# Patient Record
Sex: FEMALE
[De-identification: names, ages, dates, MRNs, and addresses within clinical notes are randomized per-mention and may not be internally consistent; named-entity substitution may affect disease eponyms.]

---

## 2018-03-19 ENCOUNTER — HOSPITAL ENCOUNTER (EMERGENCY)
Dept: HOSPITAL 97 - ER | Age: 53
Discharge: HOME | End: 2018-03-19
Payer: COMMERCIAL

## 2018-03-19 VITALS — TEMPERATURE: 97.1 F | SYSTOLIC BLOOD PRESSURE: 128 MMHG | DIASTOLIC BLOOD PRESSURE: 92 MMHG | OXYGEN SATURATION: 99 %

## 2018-03-19 DIAGNOSIS — Z88.8: ICD-10-CM

## 2018-03-19 DIAGNOSIS — F41.0: Primary | ICD-10-CM

## 2018-03-19 DIAGNOSIS — Z85.3: ICD-10-CM

## 2018-03-19 PROCEDURE — 99283 EMERGENCY DEPT VISIT LOW MDM: CPT

## 2018-03-19 NOTE — EDPHYS
Physician Documentation                                                                           

 [facility]                                                                

Name: Mackenzie Morrow                                                                           

Age: 52 yrs                                                                                       

Sex: Female                                                                                       

: 1965                                                                                   

MRN: [id number]                                                                                   

Arrival Date: 2018                                                                          

Time: 09:58                                                                                       

Account#: [id number]                                                                            

Bed 10                                                                                            

Private MD:                                                                                       

ED Physician Xavier Melchor                                                                         

HPI:                                                                                              

                                                                                             

12:37 This 52 yrs old  Female presents to ER via Ambulatory with complaints of        jr8 

      Anxiety.                                                                                    

12:37 The patient presents to the emergency department with anxiety, depression. Onset: The   jr8 

      symptoms/episode began/occurred gradually, 1 month(s) ago. Associated signs and             

      symptoms: The patient has no apparent associated signs or symptoms. Severity of             

      symptoms: At their worst the symptoms were moderate in the emergency department the         

      symptoms are unchanged. The patient has experienced similar episodes in the past, a few     

      times. The patient has not recently seen a physician. Patient stated that she has been      

      on Lexapro for some time. Had been weaned down on dosage but went back up to 10 after       

      starting to have anxiety. Stated that over the past month has had a lot of personal         

      changes and has been more anxious. Was recently started on Klonopin which helps at          

      night but as soon as she wakes up feels anxious again. Stated that today has been the       

      worst that it has been .                                                                    

                                                                                                  

OB/GYN:                                                                                           

10:19 LMP N/A - Post-menopause                                                                aj  

                                                                                                  

Historical:                                                                                       

- Allergies:                                                                                      

10:19 Benadryl;                                                                               aj  

- Home Meds:                                                                                      

10:19 Lexapro Oral [Active]; unknown antidepressant [Active];                                 aj  

- PMHx:                                                                                           

10:19 Anxiety; Depression; breast cancer;                                                     aj  

- PSHx:                                                                                           

10:19 Lumpectomy;                                                                             aj  

                                                                                                  

- Immunization history:: Adult Immunizations not up to date.                                      

- Social history:: Smoking status: Patient/guardian denies using tobacco.                         

                                                                                                  

                                                                                                  

ROS:                                                                                              

12:37 Eyes: Negative for injury, pain, redness, and discharge, ENT: Negative for injury,      jr8 

      pain, and discharge, Neck: Negative for injury, pain, and swelling, Cardiovascular:         

      Negative for chest pain, palpitations, and edema, Respiratory: Negative for shortness       

      of breath, cough, wheezing, and pleuritic chest pain, Abdomen/GI: Negative for              

      abdominal pain, nausea, vomiting, diarrhea, and constipation, Back: Negative for injury     

      and pain, MS/Extremity: Negative for injury and deformity, Skin: Negative for injury,       

      rash, and discoloration, Neuro: Negative for headache, weakness, numbness, tingling,        

      and seizure.                                                                                

12:37 Psych: Positive for anxiety.                                                                

                                                                                                  

Exam:                                                                                             

12:37 Head/Face:  Normocephalic, atraumatic. Eyes:  Pupils equal round and reactive to light, jr8 

      extra-ocular motions intact.  Lids and lashes normal.  Conjunctiva and sclera are           

      non-icteric and not injected.  Cornea within normal limits.  Periorbital areas with no      

      swelling, redness, or edema. ENT:  Nares patent. No nasal discharge, no septal              

      abnormalities noted.  Tympanic membranes are normal and external auditory canals are        

      clear.  Oropharynx with no redness, swelling, or masses, exudates, or evidence of           

      obstruction, uvula midline.  Mucous membranes moist. Neck:  Trachea midline, no             

      thyromegaly or masses palpated, and no cervical lymphadenopathy.  Supple, full range of     

      motion without nuchal rigidity, or vertebral point tenderness.  No Meningismus.             

      Cardiovascular:  Regular rate and rhythm with a normal S1 and S2.  No gallops, murmurs,     

      or rubs.  Normal PMI, no JVD.  No pulse deficits. Respiratory:  Lungs have equal breath     

      sounds bilaterally, clear to auscultation and percussion.  No rales, rhonchi or wheezes     

      noted.  No increased work of breathing, no retractions or nasal flaring. Abdomen/GI:        

      Soft, non-tender, with normal bowel sounds.  No distension or tympany.  No guarding or      

      rebound.  No evidence of tenderness throughout. Back:  No spinal tenderness.  No            

      costovertebral tenderness.  Full range of motion. Skin:  Warm, dry with normal turgor.      

      Normal color with no rashes, no lesions, and no evidence of cellulitis. MS/ Extremity:      

      Pulses equal, no cyanosis.  Neurovascular intact.  Full, normal range of motion. Neuro:     

       Awake and alert, GCS 15, oriented to person, place, time, and situation.  Cranial          

      nerves II-XII grossly intact.  Motor strength 5/5 in all extremities.  Sensory grossly      

      intact.  Cerebellar exam normal.  Normal gait.                                              

12:37 Constitutional: The patient appears alert, awake, anxious.                                  

12:37 Psych: Behavior/mood is cooperative, anxious, Affect is calm, Oriented to person,           

      place, time, Patient has no thoughts/intents to harm self or others. Judgement /            

      Insight is normal. Memory is normal. Delusions/hallucinations are not present.              

                                                                                                  

Vital Signs:                                                                                      

10:19  / 92; Pulse 79; Resp 17; Temp 97.1; Pulse Ox 99% on R/A; Weight 80.74 kg; Height aj  

      5 ft. 1 in. (154.94 cm); Pain 0/10;                                                         

10:19 Body Mass Index 33.63 (80.74 kg, 154.94 cm)                                             aj  

                                                                                                  

MDM:                                                                                              

11:38 Patient medically screened.                                                             jr8 

13:27 Data reviewed: vital signs, nurses notes, and as a result, I will discharge patient.    jr8 

      Data interpreted: Pulse oximetry: on room air is 99 %. Interpretation: normal.              

      Counseling: I had a detailed discussion with the patient and/or guardian regarding: the     

      historical points, exam findings, and any diagnostic results supporting the                 

      discharge/admit diagnosis, the need for outpatient follow up, a family practitioner, to     

      return to the emergency department if symptoms worsen or persist or if there are any        

      questions or concerns that arise at home. Response to treatment: the patient's symptoms     

      have markedly improved after treatment.                                                     

                                                                                                  

Administered Medications:                                                                         

13:08 Drug: XANax Tablet 1 mg Route: PO;                                                      iw  

13:40 Follow up: Response: No adverse reaction                                                iw  

                                                                                                  

                                                                                                  

Disposition:                                                                                      

15:11 Co-signature as Attending Physician, Xavier Melchor MD.                                    rn  

                                                                                                  

Disposition:                                                                                      

18 13:28 Discharged to Home. Impression: Panic disorder [episodic paroxysmal anxiety]       

  without agoraphobia, Anxiety disorder, unspecified.                                             

- Condition is Stable.                                                                            

- Discharge Instructions: Panic Attacks, Generalized Anxiety Disorder.                            

                                                                                                  

- Medication Reconciliation Form, Thank You Letter, Antibiotic Education, Prescription            

  Opioid Use form.                                                                                

- Follow up: Private Physician; When: Tomorrow; Reason: Recheck today's complaints,               

  Continuance of care, Re-evaluation by your physician.                                           

- Problem is new.                                                                                 

- Symptoms have improved.                                                                         

                                                                                                  

                                                                                                  

                                                                                                  

Signatures:                                                                                       

Reshma Padgett RN RN aj Williams, Irene, RN RN iw Nieto, Roman, MD MD rn Roszak, Josh, PA PA   jr8                                                  

                                                                                                  

**************************************************************************************************

## 2018-03-19 NOTE — XMS REPORT
Clinical Summary

 Created on:2018



Patient:Mackenzie Morrow

Sex:Female

:1965

External Reference #:[id number]





Demographics







 Address  [address] [place] [address]

 

 Home Phone  [phone]

 

 Work Phone  [phone]

 

 Email Address  [email or web address]

 

 Preferred Language  English

 

 Marital Status  

 

 Synagogue Affiliation  Unknown

 

 Race  Unknown

 

 Ethnic Group   or 









Author







 Organization  [facility]

 

 Address  [address] [place] [address]









Support







 Name  Relationship  Address  Phone

 

 Ralph Morrow  Spouse  Unavailable  [phone]









Care Team Providers







 Name  Role  Phone

 

 Maria Elena Perrin MD  Primary Care Provider  [phone]









Allergies







 Active Allergy  Reactions  Severity  Noted Date  Comments

 

 Diphenhydramine Hcl  Shortness Of Breath  High  2016  Chest Pain







Current Medications







 Prescription  Sig.  Disp.  Refills  Start Date  End Date  Status

 

 escitalopram (LEXAPRO)  TAKE 1    0  2017    Active



 20 MG tablet  TABLET (20          



   MG) BY MOUTH          



   EVERY          



   MORNING.          

 

 methylphenidate      0  04/10/2017    Active



 (RITALIN) 5 MG tablet            

 

 ERGOCALCIFEROL,  Take 1          Active



 VITAMIN D2, ORAL  tablet by          



   mouth.          

 

 escitalopram (LEXAPRO)      0  2017  Discontinued



 10 MG tablet            

 

 fluconazole (DIFLUCAN)  Take 1  1 tablet  0  2017  



 150 MG tablet  tablet (150          



   mg total) by          



   mouth once          



   for 1 dose.          







Active Problems

Not on file



Encounters







 Date  Type  Specialty  Care Team  Description

 

 2017  Office Visit  Obstetrics and  Kristin Álvarez,  Visit for 
gynecologic examination (Primary Dx);



     Gynecology  MD  Vaginal itching;



         Screening for malignant neoplasm of bladder



after 2017



Social History







 Tobacco Use  Types  Packs/Day  Years Used  Date

 

 Never Smoker        









 Alcohol Use  Drinks/Week  oz/Week  Comments

 

 No      









 Sex Assigned at Birth  Date Recorded

 

 Not on file  







Last Filed Vital Signs







 Vital Sign  Reading  Time Taken

 

 Blood Pressure  -  -

 

 Pulse  -  -

 

 Temperature  -  -

 

 Respiratory Rate  -  -

 

 Oxygen Saturation  -  -

 

 Inhaled Oxygen Concentration  -  -

 

 Weight  -  -

 

 Height  154.9 cm (5' 1")  2017 10:31 AM CDT

 

 Body Mass Index  -  -







Plan of Treatment







 Health Maintenance  Due Date  Last Done  Comments

 

 PAP SMEAR  10/13/1985    

 

 COLONOSCOPY  10/13/2014    

 

 MAMMOGRAM  10/13/2014    

 

 INFLUENZA VACCINE  2017    







Results

Urinalysis, routine with microscopic examination on positives (2017  4:38 
PM)





 Component  Value  Ref Range

 

 Specific gravity, urine  1.015  1.005 - 1.030

 

 pH, urine  6.0  5.0 - 7.5

 

 Color, UA  Yellow  Yellow

 

 Appearance  Clear  Clear

 

 WBC esterase, urine  Negative  Negative

 

 Protein, UA  Negative  Negative/Trace

 

 Glucose, urine  Negative  Negative

 

 Ketones, UA  Negative  Negative

 

 Occult blood, urine  Negative  Negative

 

 Bilirubin, UA  Negative  Negative

 

 Urobilinogen, UA  0.2  0.2 - 1.0 mg/dL

 

 Nitrite, UA  Negative  Negative

 

 Microscopic examination  CommentComment: Microscopic not indicated and  



   not performed.  









 Specimen  Performing Laboratory

 

   [facility]









 Narrative

 

 Performed at:[address] [place], [place] [id number]







 : Jose Saini MD, Phone:[phone]



Specimen Status Report (2017  4:38 PM)





 Component  Value  Ref Range

 

 Specimen Status Report  COMMENT  



   Comment:  



   NTI Urine Tube (Gray)  



   NTI Urine Tube (Gray)  



   WOODS 17  



   A urine culture transport was received with no test indicated. If  



   testing is required on this specimen, please contact the [facility]  



   Client Inquiry/Technical Services Department to obtain a Request for  



   Written Authorization Form.  



     









 Specimen  Performing Laboratory

 

   [facility]









 Narrative

 

 Performed at:[address] [place], [place] [id number]







 : Jose Saini MD, Phone:[phone]



NuSwab BV and Alicja, PARAMJIT (2017 11:03 AM)





 Component  Value  Ref Range

 

 Atopobium vaginae  Low - 0  Score

 

 BVAB 2  Low - 0  Score

 

 Megasphaera species  Low - 0  Score



   Comment:  



   Calculate total score by adding the 3 individual bacterial  



   vaginosis (BV) marker scores together.Total score is  



   interpreted as follows:  



   Total score 0-1: Indicates the absence of BV.  



   Total score 2: Indeterminate for BV. Additional clinical  



    data should be evaluated to establish a  



    diagnosis.  



   Total score 3-6: Indicates the presence of BV.  



   This test was developed and its performance characteristics  



   determined by [facility].It has not been cleared or approved  



   by the Food and Drug Administration.The FDA has determined  



   that such clearance or approval is not necessary.  



     

 

 Candida albicans, PARAMJIT  Positive (A)  Negative

 

 C. glabrata, DNA  Negative  Negative



   Comment:  



   This test was developed and its performance characteristics determined  



   by [facility].It has not been cleared or approved by the Food and Drug  



   Administration.The FDA has determined that such clearance or  



   approval is not necessary.  



     









 Specimen  Performing Laboratory

 

 Swab  [facility]









 Narrative

 

 Performed at: [id number]







 : Maxx Jiménez MD, Phone:[phone]



Gynecologic Pap Test (Image-guided), Liquid-based Preparation and Human 
Papillomavirus (HPV) (Aptima) With Reflex to HPV Genotypes 16 and 18,45  16 
and 18 (2017 11:02 AM)





 Component  Value  Ref Range

 

 Diagnosis  Comment  



   Comment:  



   NEGATIVE FOR INTRAEPITHELIAL LESION AND MALIGNANCY.  



   FUNGAL ORGANISMS MORPHOLOGICALLY CONSISTENT WITH ALICJA SPECIES ARE  



   PRESENT.  



     

 

 Specimen adequacy  Comment  



   Comment:  



   Satisfactory for evaluation.No endocervical component is identified.  



   The absence of an endocervical component was confirmed by an additional  



   screening evaluation.  



     

 

 Clinician provided ICD10  CommentComment: Z01.419  

 

 Performed by:  CommentComment: Carmen Hunt,  



   Cytotechnologist (ASCP)  

 

 QC reviewed by:  CommentComment: Angela Johnson,  



   Cytotechnologist (ASCP)  

 

 Comment  .  

 

 Pathologist provided ICD10  CommentComment: R87.5  

 

 Note:  Comment  



   Comment:  



   The Pap smear is a screening test designed to aid in the detection of  



   premalignant and malignant conditions of the uterine cervix.It is not a  



   diagnostic procedure and should not be used as the sole means of detecting  



   cervical cancer.Both false-positive and false-negative reports do occur.
  



     

 

 Test methodology  Comment  



   Comment:  



   This liquid based ThinPrep(R) pap test was screened with the  



   use of an image guided system.  



     

 

 HPV Aptima  Negative  Negative



   Comment:  



   This test detects fourteen high-risk HPV types (16/18/31/33/35/39/45/  



   51/52/56/58/59/66/68) without differentiation.  



     









 Specimen  Performing Laboratory

 

 Swab  [facility]









 Narrative

 

 Performed at: [facility]







 [address] [facility], [id number]







 : Justine Nobles MD, Phone:[phone]







 Performed at: [facility]







 [address] [place], [id number]







 : Justine Nobles MD, Phone:[phone]







 Specimen Comment: No. of containers..01 CYTYC Thin Prep Vial



after 2017



Insurance







 Payer  Benefit Plan / Group  Subscriber ID  Type  Phone  Address

 

 BCBS  BCBS CHOICE PPO/F[employer] PPO  xxxxxxxxxxxx  PPO    









 Guarantor Name  Account Type  Relation to  Date of  Phone  Billing Address



     Patient  Birth    

 

 MACKENZIE MORROW  Personal/Famil  Self  1965  Work:  [employer]



   y      [phone]  COURT







         [address]



  [place],



         Home:  [place] [address]



         [phone]  



         [facility]

## 2018-03-19 NOTE — ER
Nurse's Notes                                                                                     

 [facility]                                                                

Name: Mackenzie Morrow                                                                           

Age: 52 yrs                                                                                       

Sex: Female                                                                                       

: 1965                                                                                   

MRN: [id number]                                                                                   

Arrival Date: 2018                                                                          

Time: 09:58                                                                                       

Account#: [id number]                                                                            

Bed 10                                                                                            

Private MD:                                                                                       

Diagnosis: Panic disorder [episodic paroxysmal anxiety] without agoraphobia;Anxiety disorder,     

  unspecified                                                                                     

                                                                                                  

Presentation:                                                                                     

                                                                                             

10:17 Presenting complaint: Patient states: Reports feeling increased anxiety and itchiness   aj  

      after anxiety medication was added 5 days ago. Patient called PCP and has appointment       

      for follow up tomorrow. Denies SOB. NAD at this time. Alert and oriented with               

      respirations even and unlabored. Transition of care: patient was not received from          

      another setting of care. Onset of symptoms was 2018. Care prior to arrival:       

      None.                                                                                       

10:17 Method Of Arrival: Ambulatory                                                           aj  

10:17 Acuity: KELLY 4                                                                           aj  

                                                                                                  

Triage Assessment:                                                                                

10:19 General: Appears in no apparent distress. comfortable, Behavior is calm, cooperative,   aj  

      appropriate for age. Pain: Denies pain. Neuro: Level of Consciousness is awake, alert,      

      obeys commands, Oriented to person, place, time, situation. Respiratory: Airway is          

      patent Respiratory effort is even, unlabored, Respiratory pattern is regular,               

      symmetrical. Derm: Skin is intact, is healthy with good turgor, Skin is pink, warm \T\      

      dry. normal.                                                                                

                                                                                                  

OB/GYN:                                                                                           

10:19 LMP N/A - Post-menopause                                                                aj  

                                                                                                  

Historical:                                                                                       

- Allergies:                                                                                      

10:19 Benadryl;                                                                               aj  

- Home Meds:                                                                                      

10:19 Lexapro Oral [Active]; unknown antidepressant [Active];                                 aj  

- PMHx:                                                                                           

10:19 Anxiety; Depression; breast cancer;                                                     aj  

- PSHx:                                                                                           

10:19 Lumpectomy;                                                                             aj  

                                                                                                  

- Immunization history:: Adult Immunizations not up to date.                                      

- Social history:: Smoking status: Patient/guardian denies using tobacco.                         

                                                                                                  

                                                                                                  

Screenin:00 Abuse screen: Denies threats or abuse. Denies injuries from another. Nutritional        iw  

      screening: No deficits noted. Tuberculosis screening: No symptoms or risk factors           

      identified. Fall Risk None identified.                                                      

                                                                                                  

Assessment:                                                                                       

13:48 General: Appears in no apparent distress. comfortable, Behavior is calm, cooperative.   iw  

      Neuro: Level of Consciousness is awake, alert, obeys commands, Oriented to person,          

      place, time, situation, Moves all extremities. Full function. Respiratory: Respiratory      

      effort is even, unlabored, Respiratory pattern is regular, symmetrical.                     

      Musculoskeletal: Range of motion: intact in all extremities.                                

                                                                                                  

Vital Signs:                                                                                      

10:19  / 92; Pulse 79; Resp 17; Temp 97.1; Pulse Ox 99% on R/A; Weight 80.74 kg; Height aj  

      5 ft. 1 in. (154.94 cm); Pain 0/10;                                                         

10:19 Body Mass Index 33.63 (80.74 kg, 154.94 cm)                                             aj  

                                                                                                  

ED Course:                                                                                        

09:58 Patient arrived in ED.                                                                  mr  

10:18 Triage completed.                                                                       aj  

10:19 Arm band placed on right wrist. Patient placed in waiting room, Patient notified of     aj  

      wait time.                                                                                  

11:38 Moises Mora PA is PHCP.                                                               jr8 

11:38 Xavier Melchor MD is Attending Physician.                                                jr8 

11:57 Cassie Hooker, RN is Primary Nurse.                                                   iw  

13:48 Patient has correct armband on for positive identification.                             iw  

14:30 No provider procedures requiring assistance completed. Patient did not have IV access   iw  

      during this emergency room visit.                                                           

                                                                                                  

Administered Medications:                                                                         

13:08 Drug: XANax Tablet 1 mg Route: PO;                                                      iw  

13:40 Follow up: Response: No adverse reaction                                                iw  

                                                                                                  

                                                                                                  

Outcome:                                                                                          

13:28 Discharge ordered by MD.                                                                jr8 

14:30 Discharged to home via wheelchair, with family.                                         iw  

14:30 Condition: good                                                                             

14:30 Discharge instructions given to patient, Instructed on discharge instructions, follow       

      up and referral plans. Demonstrated understanding of instructions, follow-up care.          

14:33 Patient left the ED.                                                                    iw  

                                                                                                  

Signatures:                                                                                       

Reshma Padgett RN                       Kortney Cervantes                                mr                                                   

Cassie Hooker, RN                     SYLWIA                                                      

Moises Mora PA PA   jr8                                                  

                                                                                                  

**************************************************************************************************

## 2021-03-19 ENCOUNTER — HOSPITAL ENCOUNTER (EMERGENCY)
Dept: HOSPITAL 97 - ER | Age: 56
LOS: 1 days | Discharge: HOME | End: 2021-03-20
Payer: COMMERCIAL

## 2021-03-19 DIAGNOSIS — Z85.3: ICD-10-CM

## 2021-03-19 DIAGNOSIS — R05: ICD-10-CM

## 2021-03-19 DIAGNOSIS — F41.9: Primary | ICD-10-CM

## 2021-03-19 DIAGNOSIS — Z88.8: ICD-10-CM

## 2021-03-19 DIAGNOSIS — F32.9: ICD-10-CM

## 2021-03-19 PROCEDURE — 71045 X-RAY EXAM CHEST 1 VIEW: CPT

## 2021-03-19 PROCEDURE — 99283 EMERGENCY DEPT VISIT LOW MDM: CPT

## 2021-03-19 NOTE — XMS REPORT
Continuity of Care Document

                            Created on:2021



Patient:LAUREN ZABALA

Sex:Female

:1965

External Reference #:[id number]





Demographics







                          Address                   [address] [place] [id number]

 

                          Home Phone                [phone] CELL

 

                          Mobile Phone              [phone]

 

                          Email Address             [email or web address]

 

                          Preferred Language        English

 

                          Marital Status            Unknown

 

                          Jainism Affiliation     Unknown

 

                          Race                      Unknown

 

                          Additional Race(s)        Unavailable



                                                    Unavailable



                                                    White

 

                          Ethnic Group              Unknown









Author







                          Organization              [facility]

t

 

                          Address                   [address]



                                                    [place] [address]

 

                          Phone                     [phone]









Support







                Name            Relationship    Address         Phone

 

                Cristhian        Spouse          Unavailable     [phone]









Care Team Providers







                    Name                Role                Phone

 

                    ANTONIO              Primary Care Physician Unavailable

 

                    Renny BHATT         Attending Clinician [phone]

 

                    Lab,  [facility]     Attending Clinician Unavailable

 

                    Doctor Unassigned,  Name Attending Clinician Unavailable

 

                    SAL                Attending Clinician Unavailable









Problems

This patient has no known problems.



Allergies, Adverse Reactions, Alerts

This patient has no known allergies or adverse reactions.



Medications

This patient has no known medications.



Procedures

This patient has no known procedures.



Encounters







        Start   End     Encounter Admission Attending Care    Care    Encounter 

Source



        Date/Time Date/Time Type    Type    Clinicians Facility Department ID   

   

 

        2021 Urgent          [facility]    1.2.840.114 [id number]

[id number] 



        17:07:56 17:27:56 [facility]  350.1.13.10         



                                                [place] [id number]         



                                                [facility] [phone]         



                                                [place] [id number]             



                                                Office                  



                                                Building                 



                                                One                     

 

        2021 Laboratory         Lab, [facility]    1..840.114 [id number]

[id number] 



        10:23:14 10:43:14 Only            [facility]  350.1.13.10         



                                                [place] [id number]         



                                                [facility] [phone]         



                                                [place] [address]             



                                                Office                  



                                                Building                 



                                                One                     

 

        2021 Laboratory         Lab, [facility]    1.2.840.114 [id number]

[id number] 



        13:31:30 13:51:30 Only            [facility]  350.1.13.10         



                                                [place] [id number]         



                                                [facility] [phone]         



                                                nal     [facility]             



                                                Office                  



                                                Building                 



                                                One                     

 

        2020 Laboratory         Lab, [facility]    1.2.840.114 [id number]

[id number] 



        11:04:37 11:24:37 Only            [facility]  350.1.13.10         



                                                [place] [id number]         



                                                [facility] [phone]         



                                                [place] [address]             



                                                Office                  



                                                Building                 



                                                One                     

 

        2020 Laboratory         Lab, [facility]    1.2.840.114 [id number]

[id number] 



        11:36:07 11:56:07 Only            [facility]  350.1.13.10         



                                                [place] [id number]         



                                                [facility] [phone]         



                                                [place] [address]             



                                                Office                  



                                                Building                 



                                                One                     

 

        2020 Laboratory         Lab, [facility]    1.2.840.114 [id number]

[id number] 



        11:53:32 12:13:32 Only            [facility]  350.1.13.10         



                                                [place] [id number]         



                                                [facility] [phone]         



                                                [place] [address]             



                                                Office                  



                                                Building                 



                                                One                     

 

        2020 Letter          Doctor  SONNY    1.2.840.114 [id number]

[id number] 



        00:00:00 00:00:00 (Out)           Unassigned, JOCELINE   350.1.13.10       

  



                                        [facility] [id number]         



                                                        [phone]         



                                                        044             

 

        2020 Outpatient         SAL,   [facility]     [id number]

[address] [place]



        00:00:00 00:00:00                 YVON Russell     Method

i



                                                                        st







Results

This patient has no known results.

## 2021-03-20 VITALS — OXYGEN SATURATION: 98 % | SYSTOLIC BLOOD PRESSURE: 121 MMHG | DIASTOLIC BLOOD PRESSURE: 77 MMHG

## 2021-03-20 VITALS — TEMPERATURE: 98.5 F

## 2021-03-20 NOTE — RAD REPORT
EXAM DESCRIPTION:  Chest Single View

 

CLINICAL HISTORY:  COUGH

 

COMPARISON:  None.

 

FINDINGS:  Single frontal radiograph view of the chest. Respiratory motion artifact.

Cardiomediastinal silhouette: Normal size and contour.

Lungs: No consolidation, pneumothorax, or pleural effusion. Low lung volumes.

Bones: No acute osseous abnormality.

Upper abdomen: No abnormality identified.

 

IMPRESSION:  1. No acute pulmonary process identified. Low lung volumes.

 

Electronically signed by:   Santi Govea   3/19/2021 11:00 PM CDT Workstation: [id number]

 

 

Due to temporary technical issues with the PACS/Fluency reporting system, reports are being signed by
 the in house radiologist without review as a courtesy to ensure prompt reporting. The interpreting r
adiologist is fully responsible for the content of the report.

## 2021-03-20 NOTE — ER
Nurse's Notes                                                                                     

 [facility]                                                                 

Name: Mackenzie Morrow                                                                           

Age: 55 yrs                                                                                       

Sex: Female                                                                                       

: 1965                                                                                   

MRN: [id number]                                                                                   

Arrival Date: 2021                                                                          

Time: 20:58                                                                                       

Account#: [id number]                                                                            

Bed 28                                                                                            

Private MD:                                                                                       

Diagnosis: Anxiety disorder, unspecified;Cough                                                    

                                                                                                  

Presentation:                                                                                     

                                                                                             

20:58 Chief complaint: EMS states: got into an argument with son at a convenience store and   dm5 

      is experiencing an episode of anxiety. Pt also reports that she has a cough since           

      getting the Moderna COVID vaccine 2 weeks ago. Coronavirus screen: cough unrelated to       

      allergies. Ebola Screen: Patient negative for fever greater than or equal to 101.5          

      degrees Fahrenheit, and additional compatible Ebola Virus Disease symptoms Patient          

      denies exposure to infectious person. Patient denies travel to an Ebola-affected area       

      in the 21 days before illness onset. No symptoms or risks identified at this time.          

      Initial Sepsis Screen: Does the patient meet any 2 criteria? No. Patient's initial          

      sepsis screen is negative. Does the patient have a suspected source of infection? No.       

      Patient's initial sepsis screen is negative. Risk Assessment: Do you want to hurt           

      yourself or someone else? Patient reports no desire to harm self or others. Onset of        

      symptoms was 2021.                                                                

20:58 Method Of Arrival: EMS: [facility] 

20:58 Acuity: KELLY 4                                                                           dm5 

                                                                                                  

Triage Assessment:                                                                                

21:01 General: Appears distressed, uncomfortable, Behavior is anxious, crying. Pain: Denies   dm5 

      pain. Neuro: No deficits noted. Cardiovascular: No deficits noted. Respiratory: Reports     

      cough that is non-productive. GI: No signs and/or symptoms were reported involving the      

      gastrointestinal system. : No signs and/or symptoms were reported regarding the           

      genitourinary system.                                                                       

                                                                                                  

Historical:                                                                                       

- Allergies:                                                                                      

21:01 Benadryl;                                                                               dm5 

- Home Meds:                                                                                      

21:01 Lexapro Oral [Active];                                                                  dm5 

- PMHx:                                                                                           

21:01 Anxiety; breast cancer; Depression;                                                     dm5 

                                                                                                  

- Immunization history:: Adult Immunizations up to date, Client reports receiving the             

  2nd dose of the Covid vaccine.                                                                  

- Social history:: Smoking status: unknown.                                                       

                                                                                                  

                                                                                                  

Screenin:20 Abuse screen: Denies threats or abuse. Nutritional screening: No deficits noted.        vg1 

      Tuberculosis screening: No symptoms or risk factors identified. Fall Risk No fall in        

      past 12 months (0 pts). No secondary diagnosis (0 pts). No IV (0 pts). Ambulatory Aid-      

      None/Bed Rest/Nurse Assist (0 pts). Gait- Normal/Bed Rest/Wheelchair (0 pts) Mental         

      Status- Oriented to own ability (0 pts). Total Mahmood Fall Scale indicates No Risk (0-24     

      pts).                                                                                       

                                                                                                  

Assessment:                                                                                       

21:18 General: Appears uncomfortable, Behavior is cooperative, anxious, crying. Pain: Denies  vg1 

      pain. Neuro: Level of Consciousness is awake, alert, obeys commands, Oriented to            

      person, place, time, situation. Cardiovascular: Patient's skin is warm and dry.             

      Respiratory: Airway is patent Respiratory effort is even, unlabored. Respiratory:           

      Reports cough that is productive. GI: Reports diarrhea, Patient currently denies            

      nausea, vomiting. : No signs and/or symptoms were reported regarding the                  

      genitourinary system. EENT: No signs and/or symptoms were reported regarding the EENT       

      system. Derm: Skin is intact, is healthy with good turgor. Musculoskeletal:                 

      Circulation, motion, and sensation intact.                                                  

22:55 Reassessment: Patient appears in no apparent distress at this time. Patient and/or      vg1 

      family updated on plan of care and expected duration. Pain level reassessed. Patient is     

      alert, oriented x 3, equal unlabored respirations, skin warm/dry/pink. Patient states       

      feeling better.                                                                             

                                                                                                  

Vital Signs:                                                                                      

21:19  / 78; Pulse 87; Resp 18; Temp 98.5(O); Pulse Ox 100% ; Weight 81.65 kg; Height 5 vg1 

      ft. 1 in. (154.94 cm); Pain 0/10;                                                           

22:55  / 77; Pulse 84; Resp 16; Pulse Ox 98% on R/A;                                    vg1 

21:19 Body Mass Index 34.01 (81.65 kg, 154.94 cm)                                             1 

                                                                                                  

ED Course:                                                                                        

20:58 Patient arrived in ED.                                                                  dm5 

21:00 Triage completed.                                                                       dm5 

21:01 Arm band placed on Patient placed in an exam room.                                      dm5 

21:03 Jie Roman RN is Primary Nurse.                                                  vg1 

21:06 John Allred MD is Attending Physician.                                             7 

21:20 Patient has correct armband on for positive identification. Placed in gown. Bed in low  vg1 

      position. Call light in reach. Side rails up X2.                                            

22:06 Chest Single View XRAY In Process Unspecified.                                          EDMS

                                                                                             

01:04 No provider procedures requiring assistance completed. Patient did not have IV access   vg1 

      during this emergency room visit.                                                           

                                                                                                  

Administered Medications:                                                                         

No medications were administered                                                                  

                                                                                                  

                                                                                                  

Outcome:                                                                                          

00:01 Discharge ordered by MD.                                                                7 

01:04 Discharged to home ambulatory.                                                          vg1 

01:04 Condition: stable                                                                           

01:04 Discharge instructions given to patient, Instructed on discharge instructions, follow       

      up and referral plans. Demonstrated understanding of instructions, follow-up care.          

01:05 Patient left the ED.                                                                    vg1 

                                                                                                  

Signatures:                                                                                       

Dispatcher [employer]                           Maida Keller, RN                    RN   dm5                                                  

Jie Roman RN                    RN   vg1                                                  

John Allred MD MD   7                                                  

                                                                                                  

**************************************************************************************************
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
